# Patient Record
Sex: MALE | Race: WHITE | Employment: FULL TIME
[De-identification: names, ages, dates, MRNs, and addresses within clinical notes are randomized per-mention and may not be internally consistent; named-entity substitution may affect disease eponyms.]

---

## 2022-03-18 PROBLEM — D72.819 LEUKOPENIA: Status: ACTIVE | Noted: 2021-11-23

## 2022-03-19 PROBLEM — E80.4 GILBERT'S DISEASE: Status: ACTIVE | Noted: 2021-11-23

## 2022-03-19 PROBLEM — N40.0 BENIGN PROSTATIC HYPERPLASIA WITHOUT LOWER URINARY TRACT SYMPTOMS: Status: ACTIVE | Noted: 2021-11-23

## 2022-09-21 ENCOUNTER — TELEMEDICINE (OUTPATIENT)
Dept: FAMILY MEDICINE CLINIC | Facility: CLINIC | Age: 57
End: 2022-09-21
Payer: COMMERCIAL

## 2022-09-21 DIAGNOSIS — Z87.09 HISTORY OF PARANASAL SINUS PAIN: Primary | ICD-10-CM

## 2022-09-21 DIAGNOSIS — R06.83 SNORING: ICD-10-CM

## 2022-09-21 DIAGNOSIS — J34.89 SINUS PRESSURE: ICD-10-CM

## 2022-09-21 PROCEDURE — 99213 OFFICE O/P EST LOW 20 MIN: CPT | Performed by: FAMILY MEDICINE

## 2022-09-21 NOTE — PROGRESS NOTES
Subjective:      Carlos A Jarrett is a 65 y.o. male Carlos A Jarrett, was evaluated through a synchronous (real-time) audio-video encounter. The patient (or guardian if applicable) is aware that this is a billable service, which includes applicable co-pays. This Virtual Visit was conducted with patient's (and/or legal guardian's) consent. The visit was conducted pursuant to the emergency declaration under the [facility], [facility] and the [employer] Act. Patient identification was verified, and a caregiver was present when appropriate. The patient was located at Home: [address]  [place] [address]. Provider was located at [facility] (Appt Dept): [address] [facility],  [address] Hilda Rob has 2 concerns his main concern is a long history of ethmoidal sinus area pain. Discomfort. Pressure almost every day. Very annoying. Not so severe or debilitating but enough that he finds himself furling his brow and thinking about it often. Not so bad as a migraine where he would have to be in a dark room etc. but again it something is been there for years he is never had the opportunity to have it addressed.     Wife is concerned about loud snoring and some unrefreshing sleep may be some dull morning headaches    No Known Allergies  Patient Active Problem List   Diagnosis    Leukopenia    Bipolar disorder ([facility] [address]    Benign prostatic hyperplasia without lower urinary tract symptoms    Gilbert's disease     Objective:      Respirations approximately 16-20 breaths per minute    Constitutional: [x] Appears well-developed and well-nourished [x] No apparent distress          Mental status: [x] Alert and awake  [x] Oriented to person/place/time [x] Able to follow commands    [] Abnormal -     Eyes:   EOM    [x]  Normal    [] Abnormal -   Sclera  [x]  Normal    [] Abnormal -          Discharge [x] None visible      HENT: [x] Normocephalic, atraumatic    [x] Mouth/Throat: Mucous membranes are moist    External Ears [x] Normal  [] Abnormal -                        Hearing is normal    Neck: [x] No visualized mass [] Abnormal -     Pulmonary/Chest: [x] Respiratory effort normal   [x] No visualized signs of difficulty breathing or respiratory distress         Musculoskeletal:   [x] Normal gait with no signs of ataxia         [x] Normal range of motion of neck            Neurological:        [x] No Facial Asymmetry (Cranial nerve 7 motor function) (limited exam due to video visit)          [x] No gaze palsy                Skin:        [x] No significant exanthematous lesions or discoloration noted on facial skin                  Psychiatric:       [x] Normal Affect        [x] No Hallucinations  Assessment:  1. History of paranasal sinus pain    2. Sinus pressure    3. Snoring       Plan:   Other medical questions have been addressed/discussed as needed. Medications adjusted as needed. Home sleep study ordered alos  1. History of paranasal sinus pain  -     Ambulatory referral to ENT  2. Sinus pressure  -     Ambulatory referral to ENT  3. Snoring      Followup:  According to instructions given today  No follow-up provider specified.

## 2022-10-11 ENCOUNTER — OFFICE VISIT (OUTPATIENT)
Dept: ENT CLINIC | Age: 57
End: 2022-10-11
Payer: COMMERCIAL

## 2022-10-11 VITALS
BODY MASS INDEX: 25.54 KG/M2 | SYSTOLIC BLOOD PRESSURE: 118 MMHG | WEIGHT: 199 LBS | DIASTOLIC BLOOD PRESSURE: 80 MMHG | HEIGHT: 74 IN

## 2022-10-11 DIAGNOSIS — J34.89 NASAL OBSTRUCTION: Primary | ICD-10-CM

## 2022-10-11 DIAGNOSIS — J34.89 SINUS PRESSURE: ICD-10-CM

## 2022-10-11 DIAGNOSIS — J34.3 NASAL TURBINATE HYPERTROPHY: ICD-10-CM

## 2022-10-11 DIAGNOSIS — J34.2 DEVIATED NASAL SEPTUM: ICD-10-CM

## 2022-10-11 PROCEDURE — 99203 OFFICE O/P NEW LOW 30 MIN: CPT | Performed by: STUDENT IN AN ORGANIZED HEALTH CARE EDUCATION/TRAINING PROGRAM

## 2022-10-11 PROCEDURE — 31231 NASAL ENDOSCOPY DX: CPT | Performed by: STUDENT IN AN ORGANIZED HEALTH CARE EDUCATION/TRAINING PROGRAM

## 2022-10-11 RX ORDER — LAMOTRIGINE 100 MG/1
TABLET ORAL
COMMUNITY
Start: 2022-09-20

## 2022-10-11 ASSESSMENT — ENCOUNTER SYMPTOMS
ABDOMINAL PAIN: 0
SINUS PAIN: 1
EYE DISCHARGE: 0
COUGH: 0
SINUS PRESSURE: 1

## 2022-10-11 NOTE — PROGRESS NOTES
HPI:  Trina Ryan is a 65 y.o. male seen New    Chief Complaint   Patient presents with    Sinus Problem     Patient presents today with c/o paranasal sinus pressure / headache x 3-4 years and has recently worsened. Patient also states that he has snoring and congestion upon waking . 26-year-old male presents as a new patient referral evaluation with complaints of sinonasal pain/pressure described as a pressure type sensation to the middle of the forehead just above the nose. This has been ongoing and chronic for the last several months and even years at times. This is now happening almost daily and he gets a relief of symptoms temporarily when he pushes straight onto the middle of his forehead with his finger. He also complains of some undulating left and right nasal airway obstruction long-term. He notices this when he is first getting up in the mornings as well as when he is exercising or running. He also has significant amount of nasal congestion with difficulty clearing his nose particular in the morning. He has trialed some intermittent use of Flonase without much benefit. No significant history of sinusitis. Past Medical History, Past Surgical History, Family history, Social History, and Medications were all reviewed with the patient today and updated as necessary. No Known Allergies    Patient Active Problem List   Diagnosis    Leukopenia    Bipolar disorder (HCC)    Benign prostatic hyperplasia without lower urinary tract symptoms    Gilbert's disease       Current Outpatient Medications   Medication Sig    lamoTRIgine (LAMICTAL) 100 MG tablet TAKE 1 TABLET BY MOUTH EVERY EVENING    lamoTRIgine (LAMICTAL) 25 MG tablet Take 2 tablets by mouth every evening    sildenafil (VIAGRA) 100 MG tablet Take 100 mg by mouth daily as needed     No current facility-administered medications for this visit.        Past Medical History:   Diagnosis Date    Bipolar disorder ([facility] [address]        Past Surgical History:   Procedure Laterality Date    APPENDECTOMY      OTHER SURGICAL HISTORY      kidney stone removal        Social History     Tobacco Use    Smoking status: Never    Smokeless tobacco: Never   Substance Use Topics    Alcohol use: Never       Family History   Problem Relation Age of Onset    Anxiety Disorder Sister     Bleeding Prob Sister     Dementia Father     Hypertension Father     Other Mother         RA    Stroke Sister         ROS:    Review of Systems   Constitutional:  Negative for fever. HENT:  Positive for congestion, sinus pressure and sinus pain. Negative for ear discharge and ear pain. Eyes:  Negative for discharge. Respiratory:  Negative for cough. Cardiovascular:  Negative for chest pain. Gastrointestinal:  Negative for abdominal pain. Endocrine: Negative for cold intolerance. Genitourinary:  Negative for difficulty urinating. Musculoskeletal:  Negative for neck pain. Skin:  Negative for rash. Allergic/Immunologic: Negative for environmental allergies. Neurological:  Positive for headaches. Negative for dizziness. Hematological:  Negative for adenopathy. PHYSICAL EXAM:    /80   Ht 6' 2\" (1.88 m)   Wt 199 lb (90.3 kg)   BMI 25.55 kg/m²     Physical Exam  Vitals and nursing note reviewed. Constitutional:       Appearance: Normal appearance. HENT:      Head: Normocephalic and atraumatic. Right Ear: Tympanic membrane, ear canal and external ear normal. There is no impacted cerumen. Left Ear: Tympanic membrane, ear canal and external ear normal. There is no impacted cerumen. Nose: Septal deviation present. No congestion or rhinorrhea. Right Turbinates: Enlarged. Left Turbinates: Enlarged. Comments: Mild anterior left-sided DNS with significant right-sided DNS with bone spur. Moderate compensatory turbinate hypertrophy     Mouth/Throat:      Mouth: Mucous membranes are moist.      Pharynx: Oropharynx is clear.  No oropharyngeal exudate or posterior oropharyngeal erythema. Eyes:      General: No scleral icterus. Pulmonary:      Effort: Pulmonary effort is normal.   Musculoskeletal:      Cervical back: Normal range of motion and neck supple. No rigidity. Lymphadenopathy:      Cervical: No cervical adenopathy. Skin:     General: Skin is warm and dry. Neurological:      Mental Status: He is alert and oriented to person, place, and time. Psychiatric:         Mood and Affect: Mood normal.         Behavior: Behavior normal.          PROCEDURE: Nasal endoscopy  INDICATIONS: Sinus pressure, nasal obstruction  DESCRIPTION: After verbal consent was obtained and a timeout was performed, the flexible fiberoptic nasal endoscope was advanced into both nares. The septum was deviated to the left anteriorly and significantly to the right posteriorly with bone spur w/ no perforations. There were no masses seen along the nasal floor or within the nasopharynx. On the R side, the mucosa was healthy and the turbinates were intact. The middle meatus was clear w/ no edema, polyps or mucopurulence and the sphenoethmoid recess was was clear. On the L side, the mucosa was healthy and the turbinates were intact. The middle meatus was clear w/ no edema, polyps or mucopurulence and the sphenoethmoid recess was was clear. The scope was then removed and the patient tolerated the procedure well w/ no complications. ASSESSMENT and PLAN        ICD-10-CM    1. Nasal obstruction  J34.89       2. Deviated nasal septum  J34.2       3. Nasal turbinate hypertrophy  J34.3       4. Sinus pressure  J34.89           Patient had significant DNS mildly to the left anteriorly and significantly to the right posteriorly. There was some compensatory turbinate hypertrophy noted. Otherwise he was reassured of no concerning findings on nasal endoscopy including no evidence of inflammatory changes to the paranasal sinuses and no mucopurulence/mucostasis.     I have recommended initiation of daily consistent medical therapy with nasal airway obstruction to include Flonase daily and saline sprays/irrigations. He can pick this up over-the-counter and use it for the next 3 to 5 weeks. We also discussed surgical intervention options to include septoplasty/bilateral SMR of turbinates. He will consider this going forward and if symptoms persist despite medical therapy he will call us to keep us updated. We discussed that snoring can also sometimes be attributed to his septum deviation but would not necessarily explain his chronic sinus pressure symptoms. Headache/migraine may also be a possible etiology to his stated symptoms.     Valdo Pimentel,   10/11/2022

## 2022-11-30 PROBLEM — G47.33 OSA (OBSTRUCTIVE SLEEP APNEA): Status: ACTIVE | Noted: 2022-11-30

## 2022-12-06 ENCOUNTER — OFFICE VISIT (OUTPATIENT)
Dept: ENT CLINIC | Age: 57
End: 2022-12-06
Payer: COMMERCIAL

## 2022-12-06 VITALS — WEIGHT: 197 LBS | HEART RATE: 67 BPM | OXYGEN SATURATION: 98 % | HEIGHT: 74 IN | BODY MASS INDEX: 25.28 KG/M2

## 2022-12-06 DIAGNOSIS — J34.3 NASAL TURBINATE HYPERTROPHY: ICD-10-CM

## 2022-12-06 DIAGNOSIS — J34.2 DEVIATED NASAL SEPTUM: ICD-10-CM

## 2022-12-06 DIAGNOSIS — J34.89 NASAL OBSTRUCTION: Primary | ICD-10-CM

## 2022-12-06 PROCEDURE — 99213 OFFICE O/P EST LOW 20 MIN: CPT | Performed by: STUDENT IN AN ORGANIZED HEALTH CARE EDUCATION/TRAINING PROGRAM

## 2022-12-06 ASSESSMENT — ENCOUNTER SYMPTOMS
EYE DISCHARGE: 0
ABDOMINAL PAIN: 0
COLOR CHANGE: 0
APNEA: 1
COUGH: 0

## 2022-12-06 NOTE — PROGRESS NOTES
HPI:  Padilla Lang is a 65 y.o. male seen Established   Chief Complaint   Patient presents with    Sinus Problem     Patient states that flonase has not been as beneficial to sinuses , he states that he would like to move forward with the next steps . 61-year-old male presents for follow-up evaluation with history of nasal airway obstruction and nasal congestion seen approximately 1 month ago with significant right greater than left DNS with bone spur and large inferior turbinate hypertrophy. He was recommended trial of conservative OTC medical therapy which he did Flonase and saline sprays daily for multiple weeks without significant benefit. He continues to struggle in regards to nasal obstruction while he sleeps at night with some history of snoring. He desires next ENT surgical interventions as an option. Past Medical History, Past Surgical History, Family history, Social History, and Medications were all reviewed with the patient today and updated as necessary. No Known Allergies    Patient Active Problem List   Diagnosis    Leukopenia    Bipolar disorder (HCC)    Benign prostatic hyperplasia without lower urinary tract symptoms    Gilbert's disease    TRINA (obstructive sleep apnea)       Current Outpatient Medications   Medication Sig    lamoTRIgine (LAMICTAL) 100 MG tablet TAKE 1 TABLET BY MOUTH EVERY EVENING    lamoTRIgine (LAMICTAL) 25 MG tablet Take 2 tablets by mouth every evening    sildenafil (VIAGRA) 100 MG tablet Take 100 mg by mouth daily as needed     No current facility-administered medications for this visit.        Past Medical History:   Diagnosis Date    Bipolar disorder [facility])        Past Surgical History:   Procedure Laterality Date    APPENDECTOMY      OTHER SURGICAL HISTORY      kidney stone removal        Social History     Tobacco Use    Smoking status: Never    Smokeless tobacco: Never   Substance Use Topics    Alcohol use: Never       Family History   Problem Relation Age of Onset    Anxiety Disorder Sister     Bleeding Prob Sister     Dementia Father     Hypertension Father     Other Mother         RA    Stroke Sister         ROS:    Review of Systems   Constitutional:  Negative for fever. HENT:  Positive for congestion. Negative for ear discharge and ear pain. Eyes:  Negative for discharge. Respiratory:  Positive for apnea. Negative for cough. Gastrointestinal:  Negative for abdominal pain. Endocrine: Negative for cold intolerance. Genitourinary:  Negative for difficulty urinating. Musculoskeletal:  Negative for neck pain. Skin:  Negative for color change and rash. Allergic/Immunologic: Negative for environmental allergies. Neurological:  Negative for dizziness. Hematological:  Negative for adenopathy. PHYSICAL EXAM:    Pulse 67   Ht 6' 2\" (1.88 m)   Wt 197 lb (89.4 kg)   SpO2 98%   BMI 25.29 kg/m²     Physical Exam  Vitals and nursing note reviewed. Constitutional:       Appearance: Normal appearance. HENT:      Head: Normocephalic and atraumatic. Right Ear: Tympanic membrane, ear canal and external ear normal. There is no impacted cerumen. Left Ear: Tympanic membrane, ear canal and external ear normal. There is no impacted cerumen. Nose: Septal deviation present. No congestion or rhinorrhea. Right Turbinates: Enlarged. Left Turbinates: Enlarged. Comments: Significant right greater than left DNS with a right-sided bone spur. Large compensatory inferior turbinate hypertrophy left greater than right. Mouth/Throat:      Mouth: Mucous membranes are moist.      Pharynx: Oropharynx is clear. No oropharyngeal exudate or posterior oropharyngeal erythema. Eyes:      General: No scleral icterus. Pulmonary:      Effort: Pulmonary effort is normal.   Musculoskeletal:      Cervical back: Normal range of motion and neck supple. No rigidity. Lymphadenopathy:      Cervical: No cervical adenopathy. Skin:     General: Skin is warm and dry. Neurological:      Mental Status: He is alert and oriented to person, place, and time. Psychiatric:         Mood and Affect: Mood normal.         Behavior: Behavior normal.              ASSESSMENT and PLAN        ICD-10-CM    1. Nasal obstruction  J34.89       2. Deviated nasal septum  J34.2       3. Nasal turbinate hypertrophy  J34.3           Today's exam shows continuation of significant right-sided DNS with bone spur and large inferior turbinate hypertrophy left greater than right. We have discussed his examination findings in regards to his long-term nasal obstruction/congestion symptoms. We have discussed surgical interventions to include septoplasty and turbinate reduction. After a review of all risk benefits alternatives to surgery he would like to go forward as discussed. I discussed all the risks of septoplasty and/or SMR of turbinates surgery including bleeding, septal hematoma, septal perforation, continued nasal obstruction, change in sense of smell, CSF leak, and numbness/tingling of upper teeth/lips, and they would like to proceed.       Valdo Pimentel,   12/6/2022

## 2023-01-12 DIAGNOSIS — G89.18 POST-OP PAIN: Primary | ICD-10-CM

## 2023-01-12 RX ORDER — ONDANSETRON 4 MG/1
4 TABLET, FILM COATED ORAL 3 TIMES DAILY PRN
Qty: 15 TABLET | Refills: 0 | Status: SHIPPED | OUTPATIENT
Start: 2023-01-12 | End: 2023-01-17

## 2023-01-12 RX ORDER — CEFUROXIME AXETIL 250 MG/1
250 TABLET ORAL 2 TIMES DAILY
Qty: 10 TABLET | Refills: 0 | Status: SHIPPED | OUTPATIENT
Start: 2023-01-12 | End: 2023-01-17

## 2023-01-23 ENCOUNTER — OFFICE VISIT (OUTPATIENT)
Dept: ENT CLINIC | Age: 58
End: 2023-01-23

## 2023-01-23 DIAGNOSIS — Z98.890 S/P NASAL SEPTOPLASTY: ICD-10-CM

## 2023-01-23 DIAGNOSIS — J34.2 DEVIATED NASAL SEPTUM: ICD-10-CM

## 2023-01-23 DIAGNOSIS — J34.89 NASAL OBSTRUCTION: Primary | ICD-10-CM

## 2023-01-23 PROCEDURE — 99024 POSTOP FOLLOW-UP VISIT: CPT | Performed by: STUDENT IN AN ORGANIZED HEALTH CARE EDUCATION/TRAINING PROGRAM

## 2023-01-23 ASSESSMENT — ENCOUNTER SYMPTOMS
EYE DISCHARGE: 0
ABDOMINAL PAIN: 0
COUGH: 0

## 2023-01-23 NOTE — PROGRESS NOTES
HPI:  Emely Elliott is a 65 y.o. male seen Established   Chief Complaint   Patient presents with    Post-Op Check     Post Op recheck of Septo/SMRITs done at [facility] 1/17/23 .        60-year-old male seen as a 1 week postoperative evaluation status post septum/SMR of turbinates. He states that he is doing quite well having a notable improvement to his chronic sinonasal pain and pressure following septoplasty for bone spur. He is also breathing significantly improved and there is been a significant decrease in his amount of snoring. Past Medical History, Past Surgical History, Family history, Social History, and Medications were all reviewed with the patient today and updated as necessary. No Known Allergies    Patient Active Problem List   Diagnosis    Leukopenia    Bipolar disorder (HCC)    Benign prostatic hyperplasia without lower urinary tract symptoms    Gilbert's disease    TRINA (obstructive sleep apnea)       Current Outpatient Medications   Medication Sig    lamoTRIgine (LAMICTAL) 100 MG tablet TAKE 1 TABLET BY MOUTH EVERY EVENING    lamoTRIgine (LAMICTAL) 25 MG tablet Take 2 tablets by mouth every evening    sildenafil (VIAGRA) 100 MG tablet Take 100 mg by mouth daily as needed     No current facility-administered medications for this visit. Past Medical History:   Diagnosis Date    Bipolar disorder [facility])        Past Surgical History:   Procedure Laterality Date    APPENDECTOMY      OTHER SURGICAL HISTORY      kidney stone removal        Social History     Tobacco Use    Smoking status: Never    Smokeless tobacco: Never   Substance Use Topics    Alcohol use: Never       Family History   Problem Relation Age of Onset    Anxiety Disorder Sister     Bleeding Prob Sister     Dementia Father     Hypertension Father     Other Mother         RA    Stroke Sister         ROS:    Review of Systems   Constitutional:  Negative for fever. HENT:  Negative for ear discharge and ear pain.     Eyes:  Negative for discharge. Respiratory:  Negative for cough. Gastrointestinal:  Negative for abdominal pain. Endocrine: Negative for cold intolerance. Genitourinary:  Negative for difficulty urinating. Musculoskeletal:  Negative for neck pain. Skin:  Negative for rash. Allergic/Immunologic: Negative for environmental allergies. Neurological:  Negative for dizziness. Hematological:  Negative for adenopathy. Psychiatric/Behavioral:  Negative for agitation. PHYSICAL EXAM:    There were no vitals taken for this visit. Physical Exam       Rodríguez splints in proper location. Single anterior suture cut and removed. Splints removed without complication revealing no active epistaxis. Nasal septum at the midline without perforation. Left-sided hemitransfixion incision clean dry and intact. Turbinates postsurgically reduced. ASSESSMENT and PLAN        ICD-10-CM    1. Nasal obstruction  J34.89       2. Deviated nasal septum  J34.2       3. S/P nasal septoplasty  Z98.890           Patient was reassured of normal expected postoperative evaluation following removal of his Rodríguez splints. Nasal septum is looking good at the midline without perforation. He should continue with saline at least 2-4 times per day for the next week and then slowly de-escalate over the next few weeks. He should continue to utilize saline at least once daily ongoing for the next couple months and a humidifier at night. Vaseline or Ayr saline gel can be used for nasal dryness as needed. Follow-up in 1 month for repeat postoperative check.     Braydon Sanders DO  1/23/2023

## 2023-02-28 ENCOUNTER — OFFICE VISIT (OUTPATIENT)
Dept: ENT CLINIC | Age: 58
End: 2023-02-28
Payer: COMMERCIAL

## 2023-02-28 VITALS — WEIGHT: 197 LBS | BODY MASS INDEX: 25.28 KG/M2 | HEIGHT: 74 IN

## 2023-02-28 DIAGNOSIS — R09.89 CHRONIC THROAT CLEARING: ICD-10-CM

## 2023-02-28 DIAGNOSIS — J34.89 NASAL OBSTRUCTION: ICD-10-CM

## 2023-02-28 DIAGNOSIS — Z98.890 S/P NASAL SEPTOPLASTY: ICD-10-CM

## 2023-02-28 DIAGNOSIS — K21.9 LARYNGOPHARYNGEAL REFLUX (LPR): Primary | ICD-10-CM

## 2023-02-28 PROCEDURE — 99213 OFFICE O/P EST LOW 20 MIN: CPT | Performed by: STUDENT IN AN ORGANIZED HEALTH CARE EDUCATION/TRAINING PROGRAM

## 2023-02-28 PROCEDURE — 31575 DIAGNOSTIC LARYNGOSCOPY: CPT | Performed by: STUDENT IN AN ORGANIZED HEALTH CARE EDUCATION/TRAINING PROGRAM

## 2023-02-28 ASSESSMENT — ENCOUNTER SYMPTOMS
EYE ITCHING: 0
SORE THROAT: 1
COUGH: 0
EYE DISCHARGE: 0

## 2023-02-28 NOTE — PROGRESS NOTES
HPI:  Charlie Washington is a 63 y.o. male seen Established   Chief Complaint   Patient presents with    Follow-up     Patient returns to discuss reflux. He has taken omeprazole and started dietary changes that have helped. 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 He has noted to have a significant improvement to his sinonasal obstruction and long-term sinonasal pain and pressure. He has had a prior history of GERD with a significant hiatal hernia noted on a prior barium swallow study years ago. He has been off omeprazole now for the last few years stating that he had minimal GERD symptoms at the time. He has had some slight increased symptoms of throat clearing and mucus to his throat. He questions whether or not this could be recurrence of GERD. He denies having any known heartburn symptoms. Past Medical History, Past Surgical History, Family history, Social History, and Medications were all reviewed with the patient today and updated as necessary. No Known Allergies    Patient Active Problem List   Diagnosis    Leukopenia    Bipolar disorder (HCC)    Benign prostatic hyperplasia without lower urinary tract symptoms    Gilbert's disease    TRINA (obstructive sleep apnea)       Current Outpatient Medications   Medication Sig    lamoTRIgine (LAMICTAL) 100 MG tablet TAKE 1 TABLET BY MOUTH EVERY EVENING    lamoTRIgine (LAMICTAL) 25 MG tablet Take 2 tablets by mouth every evening    sildenafil (VIAGRA) 100 MG tablet Take 100 mg by mouth daily as needed     No current facility-administered medications for this visit.        Past Medical History:   Diagnosis Date    Bipolar disorder [facility])        Past Surgical History:   Procedure Laterality Date    APPENDECTOMY      OTHER SURGICAL HISTORY      kidney stone removal Social History     Tobacco Use    Smoking status: Never    Smokeless tobacco: Never   Substance Use Topics    Alcohol use: Never       Family History   Problem Relation Age of Onset    Anxiety Disorder Sister     Bleeding Prob Sister     Dementia Father     Hypertension Father     Other Mother         RA    Stroke Sister         ROS:    Review of Systems   Constitutional:  Negative for chills and fever. HENT:  Positive for sore throat. Negative for hearing loss. Constant throat clearing   Eyes:  Negative for discharge and itching. Respiratory:  Negative for cough. Cardiovascular:  Negative for chest pain. Endocrine: Negative for cold intolerance and heat intolerance. Genitourinary:  Negative for difficulty urinating, dysuria, enuresis and flank pain. Musculoskeletal:  Negative for myalgias. Skin:  Negative for rash. Allergic/Immunologic: Negative for environmental allergies. Neurological:  Negative for dizziness. Hematological:  Negative for adenopathy. Psychiatric/Behavioral:  Negative for agitation and behavioral problems. All other systems reviewed and are negative. PHYSICAL EXAM:    Ht 6' 2\" (1.88 m)   Wt 197 lb (89.4 kg)   BMI 25.29 kg/m²     Physical Exam  Vitals and nursing note reviewed. Constitutional:       Appearance: Normal appearance. HENT:      Head: Normocephalic and atraumatic. Right Ear: Tympanic membrane, ear canal and external ear normal. There is no impacted cerumen. Left Ear: Tympanic membrane, ear canal and external ear normal. There is no impacted cerumen. Nose: Nose normal. No congestion or rhinorrhea. Comments: Anterior rhinoscopy with nasal septum at the midline without perforation. Small old residual stitch of the right septal mucosa cut and removed. No perforations. Turbinates postsurgically reduced highly patent nasal airways.      Mouth/Throat:      Mouth: Mucous membranes are moist.      Pharynx: Oropharynx is clear. No oropharyngeal exudate or posterior oropharyngeal erythema. Comments: Moderate mucosal inflammatory cobblestoning to the posterior oropharynx  Eyes:      General: No scleral icterus. Pulmonary:      Effort: Pulmonary effort is normal.   Musculoskeletal:      Cervical back: Normal range of motion and neck supple. No rigidity. Lymphadenopathy:      Cervical: No cervical adenopathy. Skin:     General: Skin is warm and dry. Neurological:      Mental Status: He is alert and oriented to person, place, and time. Psychiatric:         Mood and Affect: Mood normal.         Behavior: Behavior normal.        PROCEDURE: Flexible laryngoscopy  INDICATIONS: Frequent throat clearing, history of GERD, hiatal hernia  DESCRIPTION: After verbal consent was obtained and a timeout was performed, the flexible scope was advanced down one of the patient's nares into the nasopharynx. The nasal cavity and nasopharynx were clear. The scope was then turned distally down towards the oropharynx. The BOT and vallecula were clear and the epiglottis was normal in appearance. Both aryepiglottic folds were clear and there was a normal appearing glottis w/ healthy TVCs. No nodules or polyps and the cords were fully mobile. Airway widely patent. Mild interarytenoid edema and erythema. Moderate erythema to the laryngeal surface of the epiglottis. No concerning lesions or masses. No concerning lesions seen along post-cricoid region or within either piriform sinus. The posterior pharyngeal was clear as well. The scope was then carefully removed. The patient tolerated the procedure well and there were no complications. ASSESSMENT and PLAN        ICD-10-CM    1. Laryngopharyngeal reflux (LPR)  K21.9       2. Nasal obstruction  J34.89       3. S/P nasal septoplasty  Z98.890       4.  Chronic throat clearing  R09.89           Patient with significant improvement in regards to his nasal airway and chronic sinonasal congestion following septoplasty/SMR of turbinates for correction of bone spur 1 month ago. He continues to use saline daily. He has noted considerable improvement to his sleeping and exercise. He was reassured of no concerning findings on laryngoscopy outside of mild inflammatory changes to the supraglottic structures typical of an underlying LPR. For this we have discussed mild changes for which he can go forward with dietary changes decreasing food triggers and adjustment of timing of his meals. Omeprazole 20 mg daily could be utilized as a temporary method for a couple months on a daily consistent basis if symptoms were to worsen. Occasional handout has also been provided regarding LPR. He will follow-up with us as needed.     Ramos Barker, DO  2/28/2023